# Patient Record
Sex: FEMALE | Race: WHITE | NOT HISPANIC OR LATINO | Employment: FULL TIME | URBAN - METROPOLITAN AREA
[De-identification: names, ages, dates, MRNs, and addresses within clinical notes are randomized per-mention and may not be internally consistent; named-entity substitution may affect disease eponyms.]

---

## 2024-12-07 ENCOUNTER — OFFICE VISIT (OUTPATIENT)
Dept: URGENT CARE | Facility: CLINIC | Age: 24
End: 2024-12-07
Payer: COMMERCIAL

## 2024-12-07 VITALS
OXYGEN SATURATION: 99 % | HEART RATE: 75 BPM | DIASTOLIC BLOOD PRESSURE: 87 MMHG | RESPIRATION RATE: 14 BRPM | TEMPERATURE: 97.8 F | SYSTOLIC BLOOD PRESSURE: 140 MMHG

## 2024-12-07 DIAGNOSIS — J06.9 VIRAL URI WITH COUGH: ICD-10-CM

## 2024-12-07 DIAGNOSIS — R30.0 DYSURIA: Primary | ICD-10-CM

## 2024-12-07 LAB
APPEARANCE UR: CLEAR
BILIRUB UR STRIP.AUTO-MCNC: NEGATIVE MG/DL
COLOR UR: NORMAL
GLUCOSE UR STRIP.AUTO-MCNC: NORMAL MG/DL
KETONES UR STRIP.AUTO-MCNC: NEGATIVE MG/DL
LEUKOCYTE ESTERASE UR QL STRIP.AUTO: NEGATIVE
NITRITE UR QL STRIP.AUTO: NEGATIVE
PH UR STRIP.AUTO: 7 [PH]
POC APPEARANCE, URINE: CLEAR
POC BILIRUBIN, URINE: NEGATIVE
POC BLOOD, URINE: NEGATIVE
POC COLOR, URINE: YELLOW
POC CORONAVIRUS 2019 BY PCR (COV19): NOT DETECTED
POC FLU A RESULT: NOT DETECTED
POC FLU B RESULT: NOT DETECTED
POC GLUCOSE, URINE: NEGATIVE MG/DL
POC KETONES, URINE: NEGATIVE MG/DL
POC LEUKOCYTES, URINE: NEGATIVE
POC NITRITE,URINE: NEGATIVE
POC PH, URINE: 7 PH
POC PROTEIN, URINE: NEGATIVE MG/DL
POC RSV PCR: NOT DETECTED
POC SPECIFIC GRAVITY, URINE: 1.02
POC UROBILINOGEN, URINE: 0.2 EU/DL
PROT UR STRIP.AUTO-MCNC: NEGATIVE MG/DL
RBC # UR STRIP.AUTO: NEGATIVE /UL
SP GR UR STRIP.AUTO: 1.02
UROBILINOGEN UR STRIP.AUTO-MCNC: NORMAL MG/DL

## 2024-12-07 PROCEDURE — 81003 URINALYSIS AUTO W/O SCOPE: CPT

## 2024-12-07 PROCEDURE — 81002 URINALYSIS NONAUTO W/O SCOPE: CPT | Performed by: PHYSICIAN ASSISTANT

## 2024-12-07 PROCEDURE — 99212 OFFICE O/P EST SF 10 MIN: CPT | Performed by: PHYSICIAN ASSISTANT

## 2024-12-07 RX ORDER — ETONOGESTREL AND ETHINYL ESTRADIOL .12; .015 MG/D; MG/D
RING VAGINAL
COMMUNITY
Start: 2024-06-14

## 2024-12-07 RX ORDER — NITROFURANTOIN 25; 75 MG/1; MG/1
100 CAPSULE ORAL 2 TIMES DAILY
Qty: 14 CAPSULE | Refills: 0 | Status: SHIPPED | OUTPATIENT
Start: 2024-12-07 | End: 2024-12-14

## 2024-12-07 NOTE — PROGRESS NOTES
"[facility] URGENT CARE ABEBE NOTE:      Name: Nora Hunt, 24 y.o.    CSN:[id number]   MRN:[id number]    PCP: Bobbi Aragon, PhD    ALL:    Allergies   Allergen Reactions    Sulfa (Sulfonamide Antibiotics) Other       History:    Chief Complaint: Cough, Sore Throat, Nasal Congestion (X today), and Urinary Frequency (X few months)    Encounter Date: 2024      HPI: The history was obtained from the patient. Nora is a 24 y.o. female, who presents with a chief complaint of Cough, Sore Throat, Nasal Congestion (X today), and Urinary Frequency (X few months)     Nora states she has been experiencing a productive cough, sinus congestion, excessive tearing, and \"crackly breathing\" since yesterday and worsened today to the point where she was sent home from work. She denies having any sick contacts. Denies CP, vomiting, headaches, fever, or SOB.    She also states she has persistent urinary frequency. She endorses bladder issues since she was 5 years old and has worsened since having children. She has delivered one 9lb baby vaginally and had one emergency . She states she gets frequent UTIs that come with increased urinary frequency but little bladder output in combination with flank pain. She has been experiencing these symptoms the past few days and would like a prescription for Nitrofurantoin. She does endorse a history of severe pyelonephritis that almost resulted in the loss of her right kidney. She also c/o daily stress incontinence and has to wear a panty liner. Her OBGYN previously recommended a bladder sling but she was not interested in surgery at that time. She denies dysuria, hematuria, or nocturia.     Due to her schedule and family life, pt has trouble following up with her PCP and OBGYN. She plans to schedule an appointment with her OBGYN to discuss her incontinence issues.        PMHx:    Past Medical History:   Diagnosis Date    BMI 50.0-59.9, adult " (Multi)     PCOS (polycystic ovarian syndrome)               Current Outpatient Medications   Medication Sig Dispense Refill    EluRyng 0.12-0.015 mg/24 hr vaginal ring INSERT 1 RING VAGINALLY ONCE A MONTH LEAVE IN PLACE FOR 3 WEEKS, REMOVE FOR 1 WEEK      nitrofurantoin, macrocrystal-monohydrate, (Macrobid) 100 mg capsule Take 1 capsule (100 mg) by mouth 2 times a day for 7 days. 14 capsule 0     No current facility-administered medications for this visit.         PMSx:    Past Surgical History:   Procedure Laterality Date     SECTION, CLASSIC      TYMPANOSTOMY TUBE PLACEMENT         Fam Hx: No family history on file.    SOC. Hx:     Social History     Socioeconomic History    Marital status: Single     Spouse name: Not on file    Number of children: Not on file    Years of education: Not on file    Highest education level: Not on file   Occupational History    Not on file   Tobacco Use    Smoking status: Never    Smokeless tobacco: Never   Vaping Use    Vaping status: Every Day   Substance and Sexual Activity    Alcohol use: Not on file    Drug use: Not on file    Sexual activity: Not on file   Other Topics Concern    Not on file   Social History Narrative    Not on file     Social Drivers of Health     Financial Resource Strain: Not on file   Food Insecurity: Not on file   Transportation Needs: Not on file   Physical Activity: Not on file   Stress: Not on file   Social Connections: Not on file   Intimate Partner Violence: Not on file   Housing Stability: Not on file         Vitals:    24 0951   BP: 140/87   Pulse:    Resp:    Temp:    SpO2:                 Physical Exam  Constitutional:       Appearance: Normal appearance.   Eyes:      Pupils: Pupils are equal, round, and reactive to light.   Cardiovascular:      Rate and Rhythm: Normal rate and regular rhythm.      Pulses: Normal pulses.      Heart sounds: Normal heart sounds. No murmur heard.     No friction rub. No gallop.   Pulmonary:       Breath sounds: Normal breath sounds. No wheezing, rhonchi or rales.   Abdominal:      General: Abdomen is flat.      Palpations: Abdomen is soft.      Tenderness: There is no right CVA tenderness or left CVA tenderness.   Skin:     General: Skin is warm and dry.      Capillary Refill: Capillary refill takes less than 2 seconds.   Neurological:      Mental Status: She is alert and oriented to person, place, and time. Mental status is at baseline.   Psychiatric:         Mood and Affect: Mood normal.         Behavior: Behavior normal.           LABORATORY @ RADIOLOGICAL IMAGING (if done):     Results for orders placed or performed in visit on 12/07/24 (from the past 24 hours)   POCT UA (nonautomated w/o microscopy) manually resulted   Result Value Ref Range    POC Color, Urine Yellow Straw, Yellow, Light-Yellow    POC Appearance, Urine Clear Clear    POC Glucose, Urine NEGATIVE NEGATIVE mg/dl    POC Bilirubin, Urine NEGATIVE NEGATIVE    POC Ketones, Urine NEGATIVE NEGATIVE mg/dl    POC Specific Gravity, Urine 1.020 1.005 - 1.035    POC Blood, Urine NEGATIVE NEGATIVE    POC PH, Urine 7.0 No Reference Range Established PH    POC Protein, Urine NEGATIVE NEGATIVE, 30 (1+) mg/dl    POC Urobilinogen, Urine 0.2 0.2, 1.0 EU/DL    Poc Nitrite, Urine NEGATIVE NEGATIVE    POC Leukocytes, Urine NEGATIVE NEGATIVE   POCT SARS-COV-2/FLU/RSV PCR SYMPTOMATIC manually resulted   Result Value Ref Range    POC Coronavirus 2019, PCR Not Detected Not Detected    POC Flu A Result Not Detected Not Detected    POC Flu B Result Not Detected Not Detected    POC RSV PCR Not Detected Not Detected       ____________________________________________________________________    I did personally review Nora's past medical history, surgical history, social history, as well as family history (when relevant).  In this case, I also oversaw the her drug management by reviewing her medication list, allergy list, as well as the medications that I  prescribed during the UC course and/or recommended as an out-patient (including possible OTC medications such as acetaminophen, NSAIDs , etc).    After reviewing the items above, I did look at previous medical documentation, such as recent hospitalizations, office visits, and/or recent consultations with PCP/specialist.                          SDOH:   Another factor that I considered in Nora's care was her Social Determinants of Health (SDOH). During this UC encounter, she did have social determinants of health. Those SDOH influencing Nora's care are: stress and access to medical care      UC COURSE/MEDICAL DECISION MAKING:    Nora is a 24 y.o., who presents with a working diagnosis of   1. Dysuria    2. Viral URI with cough    :     with a differential to include dysuria, stress incontinence, acute cystitis, and STI for urinary issues and acute cough, rhinovirus, adenovirus, influenza, COVID, bronchitis and community acquired pneumonia.  Plan:   COVID and Flu PCR testing negative.  Supportive care recommended, note for work was provided  UA did not show evidence of infection.  Will send for culture.  Offered workup which she is mention has been already completed by her GYN, recommend she establish care with a family physician as she might need to be seen by a urologist.    I, HILDA Pope student, helped prepare the medical record for my supervising clinician, Ruben Dobbins.   HILDA Pope-S, [facility]     Supervised by  Ruben Dobbins PA-C   Advanced Practice Provider  [facility] URGENT CARE    I was present with the HILDA carnes who participated in the documentation of this note. I have personally seen and re-examined the patient and performed the medical decision-making components (assessment and plan of care). I have reviewed the PA student documentation and verified the findings in the note as written with additions or exceptions as  stated in the body of this note.    Ruben Dobbins PA-C

## 2024-12-07 NOTE — LETTER
December 7, 2024     Patient: Nora Hunt   YOB: 2000   Date of Visit: 12/7/2024       To Whom It May Concern:    It is my medical opinion that Nora Hunt may return to work on 12/08/24 . Please excuse her for today 12/07/24.    If you have any questions or concerns, please don't hesitate to call.         Sincerely,        Ruben Dobbins PA-C    CC: No Recipients

## 2024-12-09 ENCOUNTER — TELEPHONE (OUTPATIENT)
Dept: URGENT CARE | Facility: CLINIC | Age: 24
End: 2024-12-09

## 2024-12-09 ENCOUNTER — TELEPHONE (OUTPATIENT)
Dept: URGENT CARE | Facility: CLINIC | Age: 24
End: 2024-12-09
Payer: COMMERCIAL

## 2024-12-09 NOTE — TELEPHONE ENCOUNTER
----- Message from Ruben Dobbins sent at 12/9/2024  9:26 AM EST -----  Please call the patient regarding her normal result. No secondary findings consistent with an infection. Recommend she schedule appointments with GYN & UROLOGY + discontinue the macrobid.

## 2024-12-09 NOTE — RESULT ENCOUNTER NOTE
Please call the patient regarding her normal result. No secondary findings consistent with an infection. Recommend she schedule appointments with GYN & UROLOGY + discontinue the macrobid.